# Patient Record
Sex: MALE | Race: WHITE | Employment: FULL TIME | URBAN - METROPOLITAN AREA
[De-identification: names, ages, dates, MRNs, and addresses within clinical notes are randomized per-mention and may not be internally consistent; named-entity substitution may affect disease eponyms.]

---

## 2017-01-20 ENCOUNTER — HOSPITAL ENCOUNTER (OUTPATIENT)
Dept: OTHER | Age: 57
Discharge: HOME | End: 2017-01-20
Attending: INTERNAL MEDICINE | Admitting: INTERNAL MEDICINE

## 2017-01-20 LAB
ALBUMIN SERPL-MCNC: 4.2 GM/DL (ref 3.4–5)
ALP BLD-CCNC: 71 IU/L (ref 40–129)
ALT SERPL-CCNC: 19 U/L (ref 10–40)
ANION GAP SERPL CALCULATED.3IONS-SCNC: 11 MMOL/L (ref 4–16)
AST SERPL-CCNC: 17 IU/L (ref 15–37)
BILIRUB SERPL-MCNC: 0.4 MG/DL (ref 0–1)
BUN BLDV-MCNC: 11 MG/DL (ref 6–23)
CALCIUM SERPL-MCNC: 9.5 MG/DL (ref 8.3–10.6)
CHLORIDE BLD-SCNC: 101 MMOL/L (ref 99–110)
CO2: 26 MMOL/L (ref 21–32)
CREAT SERPL-MCNC: 0.7 MG/DL (ref 0.9–1.3)
GFR AFRICAN AMERICAN: >60 ML/MIN/1.73M2
GFR NON-AFRICAN AMERICAN: >60 ML/MIN/1.73M2
GLUCOSE FASTING: 243 MG/DL (ref 70–99)
LACTATE DEHYDROGENASE: 134 IU/L (ref 120–246)
POTASSIUM SERPL-SCNC: 5 MMOL/L (ref 3.5–5.1)
SODIUM BLD-SCNC: 138 MMOL/L (ref 135–145)
TOTAL PROTEIN: 6.9 GM/DL (ref 6.4–8.2)

## 2017-02-21 PROBLEM — I83.893 VARICOSE VEINS OF BILATERAL LOWER EXTREMITIES WITH OTHER COMPLICATIONS: Status: ACTIVE | Noted: 2017-02-21

## 2017-02-21 PROBLEM — Z98.890 STATUS POST ENDOVENOUS RADIOFREQUENCY ABLATION (RFA) OF SAPHENOUS VEIN: Status: ACTIVE | Noted: 2017-02-21

## 2017-05-19 PROBLEM — I83.892 VARICOSE VEINS OF LEFT LOWER EXTREMITY WITH COMPLICATIONS: Status: ACTIVE | Noted: 2017-05-19

## 2017-05-26 ENCOUNTER — HOSPITAL ENCOUNTER (OUTPATIENT)
Dept: OTHER | Age: 57
Discharge: HOME | End: 2017-05-26
Attending: INTERNAL MEDICINE | Admitting: INTERNAL MEDICINE

## 2017-05-26 LAB
BANDED NEUTROPHILS ABSOLUTE COUNT: 0.73 K/CU MM
BANDED NEUTROPHILS RELATIVE PERCENT: 5 % (ref 5–11)
DIFFERENTIAL TYPE: ABNORMAL
EOSINOPHILS ABSOLUTE: 0.3 K/CU MM
EOSINOPHILS RELATIVE PERCENT: 2 % (ref 0–3)
HCT VFR BLD CALC: 52.3 % (ref 42–52)
HEMOGLOBIN: 17.2 GM/DL (ref 13.5–18)
LYMPHOCYTES ABSOLUTE: 4.2 K/CU MM
LYMPHOCYTES RELATIVE PERCENT: 29 % (ref 24–44)
MCH RBC QN AUTO: 30.9 PG (ref 27–31)
MCHC RBC AUTO-ENTMCNC: 32.9 % (ref 32–36)
MCV RBC AUTO: 94.1 FL (ref 78–100)
MONOCYTES ABSOLUTE: 0.6 K/CU MM
MONOCYTES RELATIVE PERCENT: 4 % (ref 0–4)
PDW BLD-RTO: 13.4 % (ref 11.7–14.9)
PLATELET # BLD: 292 K/CU MM (ref 140–440)
PLT MORPHOLOGY: ABNORMAL
PMV BLD AUTO: 11.9 FL (ref 7.5–11.1)
RBC # BLD: 5.56 M/CU MM (ref 4.6–6.2)
SEGMENTED NEUTROPHILS ABSOLUTE COUNT: 8.8 K/CU MM
SEGMENTED NEUTROPHILS RELATIVE PERCENT: 60 % (ref 36–66)
WBC # BLD: 14.6 K/CU MM (ref 4–10.5)
WBC # BLD: ABNORMAL 10*3/UL

## 2017-06-02 PROBLEM — Z98.890 STATUS POST PHLEBECTOMY: Status: ACTIVE | Noted: 2017-06-02

## 2017-08-04 PROBLEM — I83.891 VARICOSE VEINS OF RIGHT LOWER EXTREMITY WITH COMPLICATIONS: Status: ACTIVE | Noted: 2017-08-04

## 2017-10-20 ENCOUNTER — HOSPITAL ENCOUNTER (OUTPATIENT)
Dept: OTHER | Age: 57
Discharge: HOME | End: 2017-10-20
Attending: INTERNAL MEDICINE | Admitting: INTERNAL MEDICINE

## 2017-10-20 LAB
ALBUMIN SERPL-MCNC: 4.6 GM/DL (ref 3.4–5)
ALP BLD-CCNC: 75 IU/L (ref 40–128)
ALT SERPL-CCNC: 23 U/L (ref 10–40)
ANION GAP SERPL CALCULATED.3IONS-SCNC: 15 MMOL/L (ref 4–16)
AST SERPL-CCNC: 19 IU/L (ref 15–37)
BILIRUB SERPL-MCNC: 0.3 MG/DL (ref 0–1)
BUN BLDV-MCNC: 20 MG/DL (ref 6–23)
CALCIUM SERPL-MCNC: 10.1 MG/DL (ref 8.3–10.6)
CHLORIDE BLD-SCNC: 102 MMOL/L (ref 99–110)
CO2: 26 MMOL/L (ref 21–32)
CREAT SERPL-MCNC: 0.7 MG/DL (ref 0.9–1.3)
GFR AFRICAN AMERICAN: >60 ML/MIN/1.73M2
GFR NON-AFRICAN AMERICAN: >60 ML/MIN/1.73M2
GLUCOSE BLD-MCNC: 153 MG/DL (ref 70–140)
LACTATE DEHYDROGENASE: 152 IU/L (ref 120–246)
POTASSIUM SERPL-SCNC: 5.2 MMOL/L (ref 3.5–5.1)
SODIUM BLD-SCNC: 143 MMOL/L (ref 135–145)
TOTAL PROTEIN: 7.2 GM/DL (ref 6.4–8.2)

## 2017-10-24 LAB
ALBUMIN ELP: 3.9 GM/DL (ref 3.2–5.6)
ALPHA-1-GLOBULIN: 0.2 GM/DL (ref 0.1–0.4)
ALPHA-2-GLOBULIN: 0.8 GM/DL (ref 0.4–1.2)
BETA GLOBULIN: 1 GM/DL (ref 0.5–1.3)
GAMMA GLOBULIN: 1.2 GM/DL (ref 0.5–1.6)
TOTAL PROTEIN: 7.2 GM/DL (ref 6.4–8.2)

## 2019-03-04 ENCOUNTER — HOSPITAL ENCOUNTER (OUTPATIENT)
Dept: CARDIAC CATH/INVASIVE PROCEDURES | Age: 59
Discharge: HOME | End: 2019-03-04
Attending: THORACIC SURGERY (CARDIOTHORACIC VASCULAR SURGERY) | Admitting: THORACIC SURGERY (CARDIOTHORACIC VASCULAR SURGERY)
Payer: COMMERCIAL

## 2019-03-04 VITALS
RESPIRATION RATE: 14 BRPM | HEIGHT: 69 IN | WEIGHT: 230 LBS | DIASTOLIC BLOOD PRESSURE: 76 MMHG | TEMPERATURE: 98.3 F | HEART RATE: 63 BPM | SYSTOLIC BLOOD PRESSURE: 113 MMHG | BODY MASS INDEX: 34.07 KG/M2 | OXYGEN SATURATION: 89 %

## 2019-03-04 PROBLEM — I73.9 PAD (PERIPHERAL ARTERY DISEASE) (HCC): Status: ACTIVE | Noted: 2019-03-04

## 2019-03-04 LAB
ACTIVATED CLOTTING TIME, LOW RANGE: 177 SEC
ACTIVATED CLOTTING TIME, LOW RANGE: 271 SEC
ANION GAP SERPL CALCULATED.3IONS-SCNC: 12 MMOL/L (ref 4–16)
APTT: 24 SECONDS (ref 21.2–33)
BUN BLDV-MCNC: 24 MG/DL (ref 6–23)
CALCIUM SERPL-MCNC: 8.9 MG/DL (ref 8.3–10.6)
CHLORIDE BLD-SCNC: 102 MMOL/L (ref 99–110)
CO2: 24 MMOL/L (ref 21–32)
CREAT SERPL-MCNC: 0.6 MG/DL (ref 0.9–1.3)
GFR AFRICAN AMERICAN: >60 ML/MIN/1.73M2
GFR NON-AFRICAN AMERICAN: >60 ML/MIN/1.73M2
GLUCOSE BLD-MCNC: 147 MG/DL (ref 70–99)
INR BLD: 0.91 INDEX
POTASSIUM SERPL-SCNC: 4.1 MMOL/L (ref 3.5–5.1)
PROTHROMBIN TIME: 10.6 SECONDS (ref 9.12–12.5)
SODIUM BLD-SCNC: 138 MMOL/L (ref 135–145)

## 2019-03-04 PROCEDURE — 37225 HC FEM POP TERRITORY ATHERECTOMY: CPT

## 2019-03-04 PROCEDURE — 6360000004 HC RX CONTRAST MEDICATION

## 2019-03-04 PROCEDURE — 2709999900 HC NON-CHARGEABLE SUPPLY

## 2019-03-04 PROCEDURE — 2500000003 HC RX 250 WO HCPCS

## 2019-03-04 PROCEDURE — C1887 CATHETER, GUIDING: HCPCS

## 2019-03-04 PROCEDURE — C2623 CATH, TRANSLUMIN, DRUG-COAT: HCPCS

## 2019-03-04 PROCEDURE — 75710 ARTERY X-RAYS ARM/LEG: CPT

## 2019-03-04 PROCEDURE — 6370000000 HC RX 637 (ALT 250 FOR IP): Performed by: THORACIC SURGERY (CARDIOTHORACIC VASCULAR SURGERY)

## 2019-03-04 PROCEDURE — C1894 INTRO/SHEATH, NON-LASER: HCPCS

## 2019-03-04 PROCEDURE — 85610 PROTHROMBIN TIME: CPT

## 2019-03-04 PROCEDURE — 85730 THROMBOPLASTIN TIME PARTIAL: CPT

## 2019-03-04 PROCEDURE — 85347 COAGULATION TIME ACTIVATED: CPT

## 2019-03-04 PROCEDURE — C1725 CATH, TRANSLUMIN NON-LASER: HCPCS

## 2019-03-04 PROCEDURE — C1724 CATH, TRANS ATHEREC,ROTATION: HCPCS

## 2019-03-04 PROCEDURE — 2580000003 HC RX 258: Performed by: THORACIC SURGERY (CARDIOTHORACIC VASCULAR SURGERY)

## 2019-03-04 PROCEDURE — 2580000003 HC RX 258

## 2019-03-04 PROCEDURE — 75625 CONTRAST EXAM ABDOMINL AORTA: CPT

## 2019-03-04 PROCEDURE — 6360000002 HC RX W HCPCS

## 2019-03-04 PROCEDURE — 6370000000 HC RX 637 (ALT 250 FOR IP)

## 2019-03-04 PROCEDURE — 80048 BASIC METABOLIC PNL TOTAL CA: CPT

## 2019-03-04 PROCEDURE — C1884 EMBOLIZATION PROTECT SYST: HCPCS

## 2019-03-04 PROCEDURE — C1769 GUIDE WIRE: HCPCS

## 2019-03-04 RX ORDER — DIAZEPAM 5 MG/1
5 TABLET ORAL ONCE
Status: COMPLETED | OUTPATIENT
Start: 2019-03-04 | End: 2019-03-04

## 2019-03-04 RX ORDER — DIPHENHYDRAMINE HCL 25 MG
50 TABLET ORAL ONCE
Status: COMPLETED | OUTPATIENT
Start: 2019-03-04 | End: 2019-03-04

## 2019-03-04 RX ORDER — SODIUM CHLORIDE 9 MG/ML
INJECTION, SOLUTION INTRAVENOUS CONTINUOUS
Status: DISCONTINUED | OUTPATIENT
Start: 2019-03-04 | End: 2019-03-04 | Stop reason: HOSPADM

## 2019-03-04 RX ORDER — CLOPIDOGREL BISULFATE 75 MG/1
75 TABLET ORAL DAILY
Qty: 30 TABLET | Refills: 0 | Status: SHIPPED | OUTPATIENT
Start: 2019-03-04 | End: 2019-03-18 | Stop reason: SDUPTHER

## 2019-03-04 RX ADMIN — DIAZEPAM 5 MG: 5 TABLET ORAL at 06:26

## 2019-03-04 RX ADMIN — SODIUM CHLORIDE: 9 INJECTION, SOLUTION INTRAVENOUS at 06:26

## 2019-03-04 RX ADMIN — DIPHENHYDRAMINE HCL 50 MG: 25 TABLET ORAL at 06:26

## 2019-03-04 ASSESSMENT — PAIN DESCRIPTION - ORIENTATION: ORIENTATION: RIGHT

## 2019-03-04 ASSESSMENT — PAIN DESCRIPTION - LOCATION: LOCATION: LEG

## 2019-03-04 ASSESSMENT — PAIN SCALES - GENERAL: PAINLEVEL_OUTOF10: 5

## 2021-06-22 PROBLEM — K42.9 UMBILICAL HERNIA WITHOUT OBSTRUCTION AND WITHOUT GANGRENE: Status: ACTIVE | Noted: 2021-06-22

## 2021-07-24 PROBLEM — D35.02 ADRENAL ADENOMA, LEFT: Status: ACTIVE | Noted: 2021-07-24

## 2021-07-24 PROBLEM — N28.1 RENAL CYST, RIGHT: Status: ACTIVE | Noted: 2021-07-24

## 2021-08-27 PROBLEM — Z09 S/P UMBILICAL HERNIA REPAIR, FOLLOW-UP EXAM: Status: ACTIVE | Noted: 2021-08-27

## 2021-09-15 PROBLEM — I83.892 VARICOSE VEINS OF LEFT LOWER EXTREMITY WITH OTHER COMPLICATIONS: Status: ACTIVE | Noted: 2021-09-15

## 2021-09-15 PROBLEM — G89.18 ACUTE POSTOPERATIVE PAIN: Status: ACTIVE | Noted: 2021-09-15

## 2021-09-26 PROBLEM — Z09 S/P UMBILICAL HERNIA REPAIR, FOLLOW-UP EXAM: Status: RESOLVED | Noted: 2021-08-27 | Resolved: 2021-09-26

## 2022-04-08 ENCOUNTER — HOSPITAL ENCOUNTER (EMERGENCY)
Age: 62
Discharge: HOME | End: 2022-04-08
Attending: EMERGENCY MEDICINE
Payer: COMMERCIAL

## 2022-04-08 VITALS
RESPIRATION RATE: 18 BRPM | DIASTOLIC BLOOD PRESSURE: 77 MMHG | OXYGEN SATURATION: 99 % | HEART RATE: 84 BPM | SYSTOLIC BLOOD PRESSURE: 148 MMHG | HEIGHT: 70 IN | WEIGHT: 242 LBS | TEMPERATURE: 98.4 F | BODY MASS INDEX: 34.65 KG/M2

## 2022-04-08 DIAGNOSIS — R31.0 GROSS HEMATURIA: Primary | ICD-10-CM

## 2022-04-08 LAB
BACTERIA: NEGATIVE /HPF
BILIRUBIN URINE: NEGATIVE MG/DL
BLOOD, URINE: ABNORMAL
CAST TYPE: ABNORMAL /HPF
CLARITY: ABNORMAL
COLOR: YELLOW
CRYSTAL TYPE: NEGATIVE /HPF
EPITHELIAL CELLS, UA: 1 /HPF
GLUCOSE, URINE: >1000 MG/DL
KETONES, URINE: ABNORMAL MG/DL
LEUKOCYTE ESTERASE, URINE: NEGATIVE
NITRITE URINE, QUANTITATIVE: NEGATIVE
PH, URINE: 6.5 (ref 5–8)
PROTEIN UA: ABNORMAL MG/DL
RBC URINE: >200 /HPF (ref 0–3)
SPECIFIC GRAVITY UA: 1.01 (ref 1–1.03)
UROBILINOGEN, URINE: 2 MG/DL (ref 0.2–1)
WBC UA: 1 /HPF (ref 0–2)

## 2022-04-08 PROCEDURE — 87077 CULTURE AEROBIC IDENTIFY: CPT

## 2022-04-08 PROCEDURE — 81001 URINALYSIS AUTO W/SCOPE: CPT

## 2022-04-08 PROCEDURE — 87186 SC STD MICRODIL/AGAR DIL: CPT

## 2022-04-08 PROCEDURE — 99284 EMERGENCY DEPT VISIT MOD MDM: CPT

## 2022-04-08 PROCEDURE — 87086 URINE CULTURE/COLONY COUNT: CPT

## 2022-04-08 RX ORDER — CEPHALEXIN 500 MG/1
500 CAPSULE ORAL 2 TIMES DAILY
Qty: 14 CAPSULE | Refills: 0 | Status: SHIPPED | OUTPATIENT
Start: 2022-04-08 | End: 2022-04-15

## 2022-04-08 NOTE — ED PROVIDER NOTES
CHIEF COMPLAINT  Chief Complaint   Patient presents with   Evan SIMONS  Carol Garcia is a 62 y.o. male with history of diabetes, hypertension who presents bright red blood in his urine for the past 2 urinations that occurred just prior to arrival.  No associated other signs or symptoms such as flank pain, dysuria, abdominal pain, scrotal pain or swelling, trauma to the abdomen. Denies known history of renal calculi, he is on Jardiance for his diabetes and was told by primary care to drink plenty of water. He has had one episode previously of blood in his urine, was treated with antibiotics for UTI and it resolved. He denies any fevers or vomiting. He is otherwise active, has been well over the course of this today. He is able to urinate without difficulty. REVIEW OF SYSTEMS  Review of Systems   History obtained from chart review and the patient, family at bedside  General ROS: positive for  - fatigue  Ophthalmic ROS: Negative  ENT ROS: negative for - headaches  Hematological and Lymphatic ROS: negative for - bleeding problems  Endocrine ROS: negative for - unexpected weight changes  Respiratory ROS: no cough, shortness of breath, or wheezing  Cardiovascular ROS: no chest pain or dyspnea on exertion  Gastrointestinal ROS: no abdominal pain, change in bowel habits, or black or bloody stools  Genito-Urinary ROS: no dysuria, trouble voiding  Musculoskeletal ROS: negative  Neurological ROS: no TIA or stroke symptoms      PAST MEDICAL HISTORY  Past Medical History:   Diagnosis Date    Diabetes ([address]     Hyperlipidemia     Hypertension     Varicose veins of both lower extremities with pain        FAMILY HISTORY  History reviewed. No pertinent family history.     SOCIAL HISTORY  Social History     Socioeconomic History    Marital status:      Spouse name: None    Number of children: None    Years of education: None    Highest education level: None   Occupational History    None   Tobacco Use    Smoking status: Former Smoker     Quit date: 2018     Years since quittin.2    Smokeless tobacco: Never Used   Substance and Sexual Activity    Alcohol use: No    Drug use: None    Sexual activity: None   Other Topics Concern    None   Social History Narrative    None     Social Determinants of Health     Financial Resource Strain:     Difficulty of Paying Living Expenses: Not on file   Food Insecurity:     Worried About Running Out of Food in the Last Year: Not on file    Leonardo of Food in the Last Year: Not on file   Transportation Needs:     Lack of Transportation (Medical): Not on file    Lack of Transportation (Non-Medical): Not on file   Physical Activity:     Days of Exercise per Week: Not on file    Minutes of Exercise per Session: Not on file   Stress:     Feeling of Stress : Not on file   Social Connections:     Frequency of Communication with Friends and Family: Not on file    Frequency of Social Gatherings with Friends and Family: Not on file    Attends Jewish Services: Not on file    Active Member of [address] [place] [address] or Organizations: Not on file    Attends Club or Organization Meetings: Not on file    Marital Status: Not on file   Intimate Partner Violence:     Fear of Current or Ex-Partner: Not on file    Emotionally Abused: Not on file    Physically Abused: Not on file    Sexually Abused: Not on file   Housing Stability:     Unable to Pay for Housing in the Last Year: Not on file    Number of [place] in the Last Year: Not on file    Unstable Housing in the Last Year: Not on file       SURGICAL HISTORY  Past Surgical History:   Procedure Laterality Date   [address]    VARICOSE VEIN SURGERY  2017    PHLEBECTOMY       CURRENT MEDICATIONS  No current facility-administered medications on file prior to encounter.      Current Outpatient Medications on File Prior to Encounter   Medication Sig Dispense Refill    amLODIPine (NORVASC) 10 MG tablet       HYDROcodone-acetaminophen (NORCO) 5-325 MG per tablet       clopidogrel (PLAVIX) 75 MG tablet Take 1 tablet by mouth daily 30 tablet 3    JARDIANCE 25 MG tablet       HUMALOG KWIKPEN 100 UNIT/ML pen       ibuprofen (ADVIL;MOTRIN) 800 MG tablet       hydrochlorothiazide (HYDRODIURIL) 12.5 MG tablet       amLODIPine-valsartan (EXFORGE)  MG per tablet       ALPRAZolam (XANAX) 1 MG tablet half tablet 2-4 times daily as needed      pravastatin (PRAVACHOL) 20 MG tablet       glimepiride (AMARYL) 4 MG tablet       metFORMIN (GLUCOPHAGE) 1000 MG tablet       bisoprolol-hydrochlorothiazide (ZIAC) 10-6.25 MG per tablet       DULoxetine (CYMBALTA) 60 MG extended release capsule            ALLERGIES  No Known Allergies    PHYSICAL EXAM  VITAL SIGNS: BP (!) 148/77   Pulse 84   Temp 98.4 °F (36.9 °C)   Resp 18   Ht 5' 10\" (1.778 m)   Wt 242 lb (109.8 kg)   SpO2 99%   BMI 34.72 kg/m²   Constitutional: Well developed, Well nourished, seen in bed, pleasant  HENT: Normocephalic, Atraumatic, Bilateral external ears normal, Oropharynx moist, No oral exudates, Nose normal.   Eyes: PERRL, EOMI, Conjunctiva normal, No discharge. Neck: Normal range of motion, Supple, No stridor. Cardiovascular: Normal heart rate, Normal rhythm, No murmurs, No rubs, No gallops. Thorax & Lungs: Normal breath sounds, No respiratory distress, No wheezing, No chest tenderness. Abdomen: Bowel sounds normal, Soft, No tenderness, no guarding, no rebound, No masses, No pulsatile masses. No CVA tap tenderness  Skin: Warm, Dry, No erythema, No rash. Extremities: Intact distal pulses, No edema, No tenderness, No cyanosis, No clubbing. Musculoskeletal: Good gross range of motion in all major joints. No major deformities noted. Neurologic: Alert & oriented x 3, Normal gross motor function, Normal gross sensory function, No focal deficits noted.    Psychiatric: Affect normal        RADIOLOGY/PROCEDURES/LABS    Labs Reviewed   URINALYSIS WITH MICROSCOPIC - Abnormal; Notable for the following components:       Result Value    Clarity, UA TURBID (*)     Glucose, Urine >1,000 (*)     Ketones, Urine TRACE (*)     Blood, Urine LARGE NUMBER OR AMOUNT OBSERVED (*)     Protein, UA TRACE (*)     Urobilinogen, Urine 2.0 (*)     RBC, UA >200 (*)     All other components within normal limits   CULTURE, URINE         Medications - No data to display    COURSE & MEDICAL DECISION MAKING  Pertinent Labs & Imaging studies reviewed. (See chart for details)    73-year-old male presents with gross hematuria. UA here not clearly demonstrated of of UTI, but patient will be trialed on Keflex, urine culture will be sent and patient referred to urology. Its possible that with his painless hematuria that he has bladder ulceration, bladder cancer, this was discussed with him and he will require follow-up with urology. He is having painless hematuria. He is not having signs or symptoms suggestive of renal calculi, he is not having any history of trauma. He is voiding without difficulty. He is otherwise at baseline health, not complaining of signs and symptoms which would make me concerned for DKA, HHS. There is no evidence of bilirubin in his urine. Discharged in stable condition, strict return precautions put into place but he is agreeable to call urology first thing Monday to arrange follow-up and to return immediately if he is unable to void. FINAL IMPRESSION  Problem List Items Addressed This Visit     None      Visit Diagnoses     Gross hematuria    -  Primary      1.    2.   3.    Patient gave me permission to discuss medical history, care, and plan with those present in the room.   Electronically signed by: Kit Arredondo MD, 4/8/2022  MD Kit Yoder MD  04/08/22 [id number]

## 2022-04-11 LAB
CULTURE: ABNORMAL
CULTURE: ABNORMAL
Lab: ABNORMAL
SPECIMEN: ABNORMAL

## 2022-04-11 NOTE — ED NOTES
The patient called today to get his urine culture results. I discussed the results with Dr. Adenike Diallo and he wanted to change the prescription of Keflex from twice daily to 4 times daily. Dr. Adenike Diallo called in the new prescription.         Gordo Schneider RN  04/11/22 1143

## 2022-04-11 NOTE — ED PROVIDER NOTES
Patient's urine culture did come back positive for Enterococcus faecalis. He is currently on Keflex. Patient was instructed to take 500 mg p.o. 4 times daily for the next 7 days. In discussion with the patient is denying fevers chills back pain dysuria or other current complaints at this time.   Prescription was called to [facility] for completion of his antibiotic course     Casey Jordan MD  04/11/22 [id number]

## 2023-03-24 PROBLEM — R22.42 LOCALIZED SWELLING OF LEFT LOWER LEG: Status: ACTIVE | Noted: 2023-03-24

## 2023-08-05 PROBLEM — I83.891 VARICOSE VEINS OF RIGHT LOWER EXTREMITY WITH OTHER COMPLICATIONS: Status: ACTIVE | Noted: 2023-08-05

## 2023-08-05 PROBLEM — R22.41 LOCALIZED SWELLING OF RIGHT FOOT: Status: ACTIVE | Noted: 2023-08-05

## 2023-10-02 PROBLEM — I83.891 VARICOSE VEINS OF LOWER EXTREMITIES WITH COMPLICATIONS, RIGHT: Status: ACTIVE | Noted: 2023-10-02

## 2024-02-05 ENCOUNTER — TELEPHONE (OUTPATIENT)
Dept: GASTROENTEROLOGY | Age: 64
End: 2024-02-05

## 2024-02-05 NOTE — TELEPHONE ENCOUNTER
Called pt. In regards to a referral for a colon screening.Lm for pt to call back to make an appt.

## 2024-02-22 PROBLEM — I83.892 VARICOSE VEINS OF LOWER EXTREMITIES WITH COMPLICATIONS, LEFT: Status: ACTIVE | Noted: 2024-02-22

## 2024-03-14 ENCOUNTER — OFFICE VISIT (OUTPATIENT)
Dept: GASTROENTEROLOGY | Age: 64
End: 2024-03-14

## 2024-03-14 VITALS
DIASTOLIC BLOOD PRESSURE: 70 MMHG | WEIGHT: 249.8 LBS | HEIGHT: 70 IN | SYSTOLIC BLOOD PRESSURE: 124 MMHG | TEMPERATURE: 97.6 F | OXYGEN SATURATION: 94 % | HEART RATE: 72 BPM | BODY MASS INDEX: 35.76 KG/M2

## 2024-03-14 DIAGNOSIS — Z12.11 COLON CANCER SCREENING: Primary | ICD-10-CM

## 2024-03-14 NOTE — PROGRESS NOTES
and no murmur noted, no evidence of AAA  ABDOMEN:  normal bowel sounds, soft, non-distended, non-tender with no hernias apprecioated nor masses palpated--no hepatosplenomegaly  NEUROLOGIC: cranial nerves 2-12 grossly intact,no focal deficit detected, no asterixis  SKIN:  no lesions, jaundice  EXTREMITIES: no clubbing, cyanosis, or edema    DATA:      CBC:    Lab Results   Component Value Date/Time    WBC 14.6 05/26/2017 09:40 AM    HGB 17.2 05/26/2017 09:40 AM    MCV 94.1 05/26/2017 09:40 AM     05/26/2017 09:40 AM     CMP:    Lab Results   Component Value Date/Time     03/04/2019 06:19 AM    K 4.1 03/04/2019 06:19 AM     03/04/2019 06:19 AM    CO2 24 03/04/2019 06:19 AM    BUN 24 03/04/2019 06:19 AM    CREATININE 0.6 03/04/2019 06:19 AM    GFRAA >60 03/04/2019 06:19 AM    LABGLOM >60 03/04/2019 06:19 AM    GLUCOSE 147 03/04/2019 06:19 AM    PROT 7.2 10/20/2017 09:23 AM    PROT 7.2 10/20/2017 09:23 AM    LABALBU 4.6 10/20/2017 09:23 AM    CALCIUM 8.9 03/04/2019 06:19 AM    BILITOT 0.3 10/20/2017 09:23 AM    ALKPHOS 75 10/20/2017 09:23 AM    AST 19 10/20/2017 09:23 AM    ALT 23 10/20/2017 09:23 AM         IMPRESSION:  1) need for screening colonoscopy  2) PAD, varicose vein issues      RECOMMENDATIONS:  1) colonoscopy          Sumit Goldstein M.D.

## 2024-04-13 PROBLEM — Z12.11 COLON CANCER SCREENING: Status: RESOLVED | Noted: 2024-03-14 | Resolved: 2024-04-13

## 2024-05-16 PROBLEM — Z09 POSTOP CHECK: Status: RESOLVED | Noted: 2021-08-27 | Resolved: 2024-05-16

## 2024-05-21 NOTE — PROGRESS NOTES
Patient will be called with an arrival time on 5/28/2024 for his procedure at [facility] on 5/29/2024.    NOTHING TO EAT OR DRINK AFTER MIDNIGHT DAY OF SURGERY    1. Enter thru the hospital main entrance on day of surgery, check in at the Information Desk. If you arrive prior to 6:00am, enter thru the ER entrance.    2. Follow the directions as prescribed by the doctor for your procedure and medications.         Morning of surgery take:Patient states\" he will take his medications after his procedure.         Stop vitamins, supplements and NSAIDS:      3. Check with your Doctor regarding stopping blood thinners and follow their instructions.    4. Do not smoke, vape or use chewing tobacco morning of surgery. Do not drink any alcoholic beverages 24 hours prior to surgery.       This includes NA Beer. No street drugs 7 days prior to surgery.    5. If you have dentures, contacts of glasses they will be removed before going to the OR; please bring a case.    6. Please bring picture ID, insurance card, paperwork from the doctor’s office (H & P, Consent, & card for implantable devices).    7. Take a shower with an antibacterial soap the night before surgery and the morning of surgery. Do not put anything on your skin      After your morning shower.    8. You will need a responsible adult to drive you home and check on you after surgery.

## 2024-05-26 NOTE — H&P
I have examined the patient within 24 hours before the procedure and there is no change in the previous history and physical exam,which has been reviewed.There is no history of sleep apnea, snoring, or stridor.  There has been no  previous adverse experience with sedation/anesthesia. There is no increased risk for aspiration of gastric contents.  The patient has been instructed that all resuscitative measures (during the operative and immediate perioperative period) will be instituted in the unlikely event that they will be needed.The patient has no pertinent past surgical or FH other than listed in the original H&P.     ASA Class: 3  AIRWAY Class: 1     Consent form signed, witnessed and in soft chart

## 2024-05-28 ENCOUNTER — ANESTHESIA EVENT (OUTPATIENT)
Dept: ENDOSCOPY | Age: 64
End: 2024-05-28
Payer: COMMERCIAL

## 2024-05-28 NOTE — ANESTHESIA PRE PROCEDURE
Department of Anesthesiology  Preprocedure Note       Name:  Arnulfo Hanley   Age:  63 y.o.  :  1960                                          MRN:  [id number]         Date:  2024      Surgeon: Surgeon(s):  Sumit Goldstein MD    Procedure: Procedure(s):  COLORECTAL CANCER SCREENING, NOT HIGH RISK    Medications prior to admission:   Prior to Admission medications    Medication Sig Start Date End Date Taking? Authorizing Provider   cilostazol (PLETAL) 50 MG tablet  7/3/23   Hamida Strickland MD   aspirin 81 MG EC tablet Take 1 tablet by mouth daily    Hamida Strickland MD   glimepiride (AMARYL) 2 MG tablet Take 1 tablet by mouth every evening Take with evening meal    Hamida Strickland MD   Insulin Glargine (BASAGLAR KWIKPEN SC) Inject 50 Units into the skin 2 times daily 50 units in AM and 50 units at bedtime    Hamida Strickland MD   gabapentin (NEURONTIN) 300 MG capsule Take 1 capsule by mouth at bedtime.    Hamida Strickland MD   HYDROcodone-acetaminophen (NORCO) 5-325 MG per tablet 1 tablet every 6 hours as needed. 21   Hamida Strickland MD   JARDIANCE 25 MG tablet 1 tablet daily 19   Hamida Strickland MD   HUMALOG KWIKPEN 100 UNIT/ML pen 10 unit with every meal plus sliding scale 17   Hamida Strickland MD   hydrochlorothiazide (HYDRODIURIL) 12.5 MG tablet 1 tablet daily 16   Hamida Strickland MD   amLODIPine-valsartan (EXFORGE)  MG per tablet 1 tablet daily 17   Hamida Strickland MD   ALPRAZolam (XANAX) 1 MG tablet half tablet 2-4 times daily as needed    Hamida Strickland MD   pravastatin (PRAVACHOL) 20 MG tablet 2 tablets 17   Hamida Strickland MD   glimepiride (AMARYL) 4 MG tablet 1 tablet every morning (before breakfast) 17   Hamida Strickland MD   metFORMIN (GLUCOPHAGE) 1000 MG tablet Take 1 tablet by mouth 2 times daily (with meals) 17   Hamida Strickland MD   bisoprolol-hydrochlorothiazide

## 2024-05-28 NOTE — PROGRESS NOTES
Left message on patient's home phone and cell for him to arrive at 0830 at [facility] on 5/29/2024 for his procedure at 1000. Orders are in epic.

## 2024-05-29 ENCOUNTER — HOSPITAL ENCOUNTER (OUTPATIENT)
Age: 64
Setting detail: OUTPATIENT SURGERY
Discharge: HOME | End: 2024-05-29
Attending: SPECIALIST | Admitting: SPECIALIST
Payer: COMMERCIAL

## 2024-05-29 ENCOUNTER — ANESTHESIA (OUTPATIENT)
Dept: ENDOSCOPY | Age: 64
End: 2024-05-29
Payer: COMMERCIAL

## 2024-05-29 VITALS
DIASTOLIC BLOOD PRESSURE: 68 MMHG | TEMPERATURE: 97.4 F | WEIGHT: 249 LBS | SYSTOLIC BLOOD PRESSURE: 129 MMHG | HEART RATE: 60 BPM | RESPIRATION RATE: 18 BRPM | HEIGHT: 70 IN | OXYGEN SATURATION: 95 % | BODY MASS INDEX: 35.65 KG/M2

## 2024-05-29 DIAGNOSIS — Z12.11 SCREEN FOR COLON CANCER: ICD-10-CM

## 2024-05-29 PROBLEM — D12.2 BENIGN NEOPLASM OF ASCENDING COLON: Status: ACTIVE | Noted: 2024-05-29

## 2024-05-29 PROBLEM — D12.0 BENIGN NEOPLASM OF CECUM: Status: ACTIVE | Noted: 2024-05-29

## 2024-05-29 LAB — GLUCOSE BLD-MCNC: 118 MG/DL (ref 70–99)

## 2024-05-29 PROCEDURE — 88305 TISSUE EXAM BY PATHOLOGIST: CPT | Performed by: PATHOLOGY

## 2024-05-29 PROCEDURE — 7100000011 HC PHASE II RECOVERY - ADDTL 15 MIN: Performed by: SPECIALIST

## 2024-05-29 PROCEDURE — 3700000001 HC ADD 15 MINUTES (ANESTHESIA): Performed by: SPECIALIST

## 2024-05-29 PROCEDURE — 2580000003 HC RX 258: Performed by: ANESTHESIOLOGY

## 2024-05-29 PROCEDURE — C1889 IMPLANT/INSERT DEVICE, NOC: HCPCS | Performed by: SPECIALIST

## 2024-05-29 PROCEDURE — 2500000003 HC RX 250 WO HCPCS: Performed by: SPECIALIST

## 2024-05-29 PROCEDURE — 7100000010 HC PHASE II RECOVERY - FIRST 15 MIN: Performed by: SPECIALIST

## 2024-05-29 PROCEDURE — 3609010200 HC COLONOSCOPY ABLATION TUMOR POLYP/OTHER LES: Performed by: SPECIALIST

## 2024-05-29 PROCEDURE — 2709999900 HC NON-CHARGEABLE SUPPLY: Performed by: SPECIALIST

## 2024-05-29 PROCEDURE — 6360000002 HC RX W HCPCS: Performed by: SPECIALIST

## 2024-05-29 PROCEDURE — 45390 COLONOSCOPY W/RESECTION: CPT | Performed by: SPECIALIST

## 2024-05-29 PROCEDURE — 82962 GLUCOSE BLOOD TEST: CPT

## 2024-05-29 PROCEDURE — 3700000000 HC ANESTHESIA ATTENDED CARE: Performed by: SPECIALIST

## 2024-05-29 PROCEDURE — 6360000002 HC RX W HCPCS: Performed by: NURSE ANESTHETIST, CERTIFIED REGISTERED

## 2024-05-29 PROCEDURE — 45385 COLONOSCOPY W/LESION REMOVAL: CPT | Performed by: SPECIALIST

## 2024-05-29 PROCEDURE — 2500000003 HC RX 250 WO HCPCS: Performed by: NURSE ANESTHETIST, CERTIFIED REGISTERED

## 2024-05-29 DEVICE — CLIP LIG L235CM RESOL 360 BX/20: Type: IMPLANTABLE DEVICE | Site: ASCENDING COLON | Status: FUNCTIONAL

## 2024-05-29 DEVICE — INSTINCT PLUS ENDOSCOPIC CLIPPING DEVICE
Type: IMPLANTABLE DEVICE | Site: ASCENDING COLON | Status: FUNCTIONAL
Brand: INSTINCT

## 2024-05-29 RX ORDER — LIDOCAINE HYDROCHLORIDE 20 MG/ML
INJECTION, SOLUTION EPIDURAL; INFILTRATION; INTRACAUDAL; PERINEURAL PRN
Status: DISCONTINUED | OUTPATIENT
Start: 2024-05-29 | End: 2024-05-29 | Stop reason: SDUPTHER

## 2024-05-29 RX ORDER — PROPOFOL 10 MG/ML
INJECTION, EMULSION INTRAVENOUS PRN
Status: DISCONTINUED | OUTPATIENT
Start: 2024-05-29 | End: 2024-05-29 | Stop reason: SDUPTHER

## 2024-05-29 RX ORDER — SODIUM CHLORIDE, SODIUM LACTATE, POTASSIUM CHLORIDE, CALCIUM CHLORIDE 600; 310; 30; 20 MG/100ML; MG/100ML; MG/100ML; MG/100ML
INJECTION, SOLUTION INTRAVENOUS CONTINUOUS
Status: DISCONTINUED | OUTPATIENT
Start: 2024-05-29 | End: 2024-05-29 | Stop reason: HOSPADM

## 2024-05-29 RX ADMIN — PROPOFOL 50 MG: 10 INJECTION, EMULSION INTRAVENOUS at 10:05

## 2024-05-29 RX ADMIN — PROPOFOL 50 MG: 10 INJECTION, EMULSION INTRAVENOUS at 10:15

## 2024-05-29 RX ADMIN — LIDOCAINE HYDROCHLORIDE 100 MG: 20 INJECTION, SOLUTION EPIDURAL; INFILTRATION; INTRACAUDAL; PERINEURAL at 09:41

## 2024-05-29 RX ADMIN — PROPOFOL 50 MG: 10 INJECTION, EMULSION INTRAVENOUS at 09:46

## 2024-05-29 RX ADMIN — PROPOFOL 50 MG: 10 INJECTION, EMULSION INTRAVENOUS at 09:55

## 2024-05-29 RX ADMIN — PROPOFOL 50 MG: 10 INJECTION, EMULSION INTRAVENOUS at 10:00

## 2024-05-29 RX ADMIN — SODIUM CHLORIDE, POTASSIUM CHLORIDE, SODIUM LACTATE AND CALCIUM CHLORIDE: 600; 310; 30; 20 INJECTION, SOLUTION INTRAVENOUS at 08:44

## 2024-05-29 RX ADMIN — PROPOFOL 50 MG: 10 INJECTION, EMULSION INTRAVENOUS at 10:10

## 2024-05-29 RX ADMIN — PROPOFOL 50 MG: 10 INJECTION, EMULSION INTRAVENOUS at 09:50

## 2024-05-29 RX ADMIN — PROPOFOL 100 MG: 10 INJECTION, EMULSION INTRAVENOUS at 09:41

## 2024-05-29 ASSESSMENT — PAIN - FUNCTIONAL ASSESSMENT
PAIN_FUNCTIONAL_ASSESSMENT: 0-10
PAIN_FUNCTIONAL_ASSESSMENT: 0-10

## 2024-05-29 NOTE — PROGRESS NOTES
Returned toroom 18. Report received from Joyce CERVANTES. Alert and oriented. Respirations even and unlabored. Color pink. Abdomen soft and nontender. Beverage provided. Cousin at bedside. Call light in reach.

## 2024-05-29 NOTE — ANESTHESIA POSTPROCEDURE EVALUATION
Department of Anesthesiology  Postprocedure Note    Patient: Arnulfo Hanley  MRN: [id number]  YOB: 1960  Date of evaluation: 5/29/2024    Procedure Summary       Date: 05/29/24 Room / Location: [place] [address] / [facility]    Anesthesia Start: 0941 Anesthesia Stop: 1022    Procedure: COLONOSCOPY W/ ENDOSCOPIC MUCOSAL RESECTION LIFTED WITH 8ML OF BLUE BOOST, REMOVED WITH HOT SNARE AND JUMBO FORCEPS AND (3) HEMICLIPS PLACED IN THE ASCENDING COLON AT POLYPECTOMY SITE, INJECTED WITH 2 ML OF TATTOO INK Diagnosis:       Screen for colon cancer      (Screen for colon cancer [Z12.11])    Surgeons: Sumit Goldstein MD Responsible Provider: Gurvinder Cha MD    Anesthesia Type: MAC ASA Status: 3            Anesthesia Type: No value filed.    Kinga Phase I: Kinga Score: 10    Kinga Phase II:      Anesthesia Post Evaluation    Patient location during evaluation: bedside  Patient participation: complete - patient participated  Level of consciousness: sleepy but conscious  Pain score: 0  Airway patency: patent  Nausea & Vomiting: no nausea and no vomiting  Cardiovascular status: blood pressure returned to baseline and hemodynamically stable  Respiratory status: acceptable and nonlabored ventilation  Hydration status: euvolemic  Pain management: adequate    There were no known notable events for this encounter.

## 2024-05-29 NOTE — BRIEF OP NOTE
BRIEF COLONOSCOPY REPORT:   The original colonoscopy report with photos can be found by going to \"chart review\" then \"notes\" then \"colonoscopy\" then \"scan....\"    Impression:         -  Preparation of the colon was fair.         -  One 4 mm polyp in the cecum, removed with a cold snare.  Resected and            retrieved.         - A 12 mm sessile polyp was found in the ascending colon and removed by EMR:            Demarcation of the margins of the lesion was done with NBI. The polyp was            adequately lifted from the muscularis mucosa with submucosal injection of Blue            Boost. Snare resection with the hot snare and Endo-cut Q coag was then            performed. The polyp was completely removed en bloc and recovered with the            biopsy forceps and suction (via the working channel). Nubbins of residual polyp            were resected with the [place] biopsy forceps and cold snare.  The margins of the            resection site were then coagulated with snare-tip soft coag. The polypectomy            site was then closed with 3 clips. The mucosa 5-10 cm distally  (toward the            anus) was then marked with Lexi ink injection.         -  Internal hemorrhoids.         -  The examination was otherwise normal on direct and retroflexion views.         - the prep was not ideal and was especially poor in the left colon- so much so            that I was unable to exclude the presence of small or flat polyps    Recommendation:         - Repeat colonoscopy in 6 months for surveillance of polyps greater than 1 cm            in size and for better visualization of the left colon

## 2024-05-29 NOTE — DISCHARGE INSTRUCTIONS
COLONOSCOPY    DR. Goldstein     OFFICE NUMBER [id number]    FOLLOW UP APPOINTMENT: call office in one week for pathology results     REPEAT PROCEDURE IN 6 months     TEST ORDERED: NONE     What to expect at home:  Your Recovery   Your doctor will tell you when you can eat and do your other usual activities Your doctor will talk to you about when you will need your next colonoscopy. Your doctor can help you decide how often you need to be checked. This will depend on the results of your test and your risk for colorectal cancer.  After the test, you may be bloated or have gas pains. You may need to pass gas. If a biopsy was done or a polyp was removed, you may have streaks of blood in your stool (feces) for a few days.  This care sheet gives you a general idea about how long it will take for you to recover. But each person recovers at a different pace. Follow the steps below to get better as quickly as possible.  How can you care for yourself at home?  Activity  Rest when you feel tired.  Diet  Follow your doctor's directions for eating.  Unless your doctor has told you not to, drink plenty of fluids. This helps to replace the fluids that were lost during the colon prep.  DO NOT DRINK ALCOHOL.  Medicines  Your doctor will tell you if and when you can restart your medicines. He or she will also give you instructions about taking any new medicines.  If you take blood thinners, such as warfarin (Coumadin), clopidogrel (Plavix), or aspirin, be sure to talk to your doctor. He or she will tell you if and when to start taking those medicines again. Make sure that you understand exactly what your doctor wants you to do.  If polyps were removed or a biopsy was done during the test, your doctor may tell you not to take aspirin or other anti-inflammatory medicines for a few days. These include ibuprofen (Advil, Motrin) and naproxen (Aleve).  Other instructions: Anethesia  For your safety, do not drive or operate machinery for 24

## 2024-06-28 PROBLEM — Z12.11 SCREEN FOR COLON CANCER: Status: RESOLVED | Noted: 2024-03-14 | Resolved: 2024-06-28

## 2024-07-06 ENCOUNTER — HOSPITAL ENCOUNTER (EMERGENCY)
Age: 64
Discharge: HOME | End: 2024-07-06
Payer: COMMERCIAL

## 2024-07-06 VITALS
TEMPERATURE: 98.1 F | DIASTOLIC BLOOD PRESSURE: 76 MMHG | RESPIRATION RATE: 17 BRPM | SYSTOLIC BLOOD PRESSURE: 127 MMHG | HEART RATE: 60 BPM | OXYGEN SATURATION: 100 %

## 2024-07-06 DIAGNOSIS — T14.8XXA BLEEDING FROM WOUND: Primary | ICD-10-CM

## 2024-07-06 PROCEDURE — 99282 EMERGENCY DEPT VISIT SF MDM: CPT

## 2024-07-06 RX ORDER — LIDOCAINE HYDROCHLORIDE AND EPINEPHRINE BITARTRATE 20; .01 MG/ML; MG/ML
20 INJECTION, SOLUTION SUBCUTANEOUS ONCE
Status: DISCONTINUED | OUTPATIENT
Start: 2024-07-06 | End: 2024-07-06 | Stop reason: HOSPADM

## 2024-07-06 ASSESSMENT — PAIN - FUNCTIONAL ASSESSMENT: PAIN_FUNCTIONAL_ASSESSMENT: 0-10

## 2024-07-06 ASSESSMENT — PAIN SCALES - GENERAL
PAINLEVEL_OUTOF10: 0
PAINLEVEL_OUTOF10: 0

## 2024-07-07 NOTE — ED PROVIDER NOTES
lacerations  noted. On L distal medial calf  there is a punctate scab without bleeding, hematoma.   Psych: Mentation is grossly normal cognition is grossly normal. Affect is appropriate  Neuro: Motor intact sensory intact cranial nerves II through XII are intact level of consciousness is normal cerebellar function is normal reflexes are grossly normal. No evidence of incontinence or loss of bowel or bladder no saddle anesthesia noted Lymphatic: There is no submandibular or cervical adenopathy appreciated.    Procedure note-wound repair:  Risk and benefits were discussed with patient patient did give verbal consent.  Wound area cleansed in standard bedside fashion.  Using skin affix skin adhesive.  Applied over lesion.  Repeat examination reveals continued hemostasis.  Patient did tolerate procedure well.  Complications: None      I have reviewed and interpreted all of the currently available lab results from this visit (if applicable):  No results found for this visit on 07/06/24.   Radiographs (if obtained):  [] The following radiograph was interpreted by myself in the absence of a radiologist:   [] Radiologist's Report Reviewed:  No orders to display       EKG (if obtained):   Please See Note of attending physician for EKG interpretation.     Chart review shows recent radiograph(s):  US DUP LOWER ART/BYPASS GRAFTS BILATERAL COMPLETE    Result Date: 6/18/2024  IMPRESSION:IMPRESSION: For full  report, see ultrasound encounter with Shruthi Devon on 6/18/24 IMPRESSION:  The right ankle brachial index has decreased by 0.10  and the left ankle brachial index has decreased by 0.12 since previous study of June 26, 2023.  Imaging of the right lower extremity revealed atherosclerotic changes throughout the superficial femoral artery with a maximum 90% stenosis, which has increased since previous study.  Patent external iliac, common femoral, deep femoral, popliteal, posterior tibial trunk, posterior tibial, anterior tibial

## 2024-07-28 ENCOUNTER — HOSPITAL ENCOUNTER (EMERGENCY)
Age: 64
Discharge: HOME | End: 2024-07-28
Attending: EMERGENCY MEDICINE
Payer: COMMERCIAL

## 2024-07-28 VITALS
WEIGHT: 239.7 LBS | DIASTOLIC BLOOD PRESSURE: 75 MMHG | HEIGHT: 70 IN | OXYGEN SATURATION: 96 % | BODY MASS INDEX: 34.32 KG/M2 | TEMPERATURE: 98 F | RESPIRATION RATE: 16 BRPM | HEART RATE: 59 BPM | SYSTOLIC BLOOD PRESSURE: 124 MMHG

## 2024-07-28 DIAGNOSIS — R10.32 LEFT GROIN PAIN: Primary | ICD-10-CM

## 2024-07-28 PROCEDURE — 99282 EMERGENCY DEPT VISIT SF MDM: CPT

## 2024-07-28 NOTE — ED PROVIDER NOTES
Emergency Department Encounter    Patient: Arnulfo Hanley  MRN: [id number]  : 1960  Date of Evaluation: 2024  ED Provider:  Caren Torres DO    Triage Chief Complaint:   Groin Pain (Left )    [place]:  Arnulfo Hanley is a 63 y.o. male with history of peripheral arterial disease, bilateral varicose veins of the lower extremities, hypertension hyperlipidemia diabetes lymphoma that presents to the emergency department complaining of left groin pain.  Patient states been going on for the past 3 days.  He states pain 7 out of 10 on the pain scale.  He states he has never had it before.  He states burning stinging type of pain in the left.  He states no falls injuries trauma lifting pulling or straining.  He states he does have peripheral artery disease with blockages in his arteries bilaterally.  He states he is on Pletal and baby aspirin daily.  He states pain 7 out of 10 on the pain scale.  Patient states lying down makes it better, standing makes it worse.  He states he is able to ambulate.  Denies any discoloration of the left lower extremity.  He denies any pain in his testicles scrotum or penis.  Patient states he does have consultation with cardiothoracic vascular surgeon Dr. Gan in September for possible surgery.  Patient states chronic back pain.  Patient here for evaluation.    ROS - see HPI, below listed is current ROS at time of my eval:  10 systems reviewed and negative except as above.     Past Medical History:   Diagnosis Date    Diabetes (HCC)     Hyperlipidemia     Hypertension     Lung nodule     Lymphoma (HCC)     Varicose veins of both lower extremities with pain      Past Surgical History:   Procedure Laterality Date    COLONOSCOPY      COLONOSCOPY N/A 2024    COLONOSCOPY W/ ENDOSCOPIC MUCOSAL RESECTION LIFTED WITH 8ML OF BLUE BOOST, REMOVED WITH HOT SNARE AND JUMBO FORCEPS AND (3) HEMICLIPS PLACED IN THE ASCENDING COLON AT POLYPECTOMY SITE, INJECTED WITH 2 ML OF TATTOO INK

## 2024-07-28 NOTE — DISCHARGE INSTRUCTIONS
Follow-up with your cardiothoracic surgeon Dr. Gan in the morning call for an appointment for further evaluation.  Follow-up with your primary care physician Dr. Carey for evaluation of left groin pain.  Call for an appointment  Continue home Norco and gabapentin prescribed and directed for pain  Return to the emergency department immediately any discoloration of the left lower extremity increased pain cold blue extremity or any worsening symptoms.

## 2024-09-06 PROBLEM — K40.90 LEFT GROIN HERNIA: Status: ACTIVE | Noted: 2024-09-06

## 2024-09-06 PROBLEM — R10.32 LEFT GROIN PAIN: Status: ACTIVE | Noted: 2024-09-06

## 2024-09-20 PROBLEM — K40.90 LEFT INGUINAL HERNIA: Status: ACTIVE | Noted: 2024-09-20

## 2024-09-30 ENCOUNTER — TELEPHONE (OUTPATIENT)
Dept: GASTROENTEROLOGY | Age: 64
End: 2024-09-30

## 2024-10-16 ENCOUNTER — TELEPHONE (OUTPATIENT)
Dept: GASTROENTEROLOGY | Age: 64
End: 2024-10-16

## 2024-10-16 ENCOUNTER — PREP FOR PROCEDURE (OUTPATIENT)
Dept: GASTROENTEROLOGY | Age: 64
End: 2024-10-16

## 2024-10-16 DIAGNOSIS — Z86.0100 PERSONAL HISTORY OF COLON POLYPS, UNSPECIFIED: ICD-10-CM

## 2024-10-16 RX ORDER — SODIUM CHLORIDE 0.9 % (FLUSH) 0.9 %
5-40 SYRINGE (ML) INJECTION EVERY 12 HOURS SCHEDULED
Status: CANCELLED | OUTPATIENT
Start: 2024-10-16

## 2024-10-16 RX ORDER — SODIUM CHLORIDE, SODIUM LACTATE, POTASSIUM CHLORIDE, CALCIUM CHLORIDE 600; 310; 30; 20 MG/100ML; MG/100ML; MG/100ML; MG/100ML
INJECTION, SOLUTION INTRAVENOUS CONTINUOUS
Status: CANCELLED | OUTPATIENT
Start: 2024-10-16

## 2024-10-16 RX ORDER — SODIUM CHLORIDE 9 MG/ML
25 INJECTION, SOLUTION INTRAVENOUS PRN
Status: CANCELLED | OUTPATIENT
Start: 2024-10-16

## 2024-10-16 RX ORDER — SODIUM CHLORIDE 0.9 % (FLUSH) 0.9 %
5-40 SYRINGE (ML) INJECTION PRN
Status: CANCELLED | OUTPATIENT
Start: 2024-10-16

## 2024-11-11 PROBLEM — Z98.890 HISTORY OF ROBOT-ASSISTED REPAIR OF LEFT INGUINAL HERNIA: Status: ACTIVE | Noted: 2024-11-11

## 2024-11-11 PROBLEM — Z87.19 HISTORY OF ROBOT-ASSISTED REPAIR OF LEFT INGUINAL HERNIA: Status: ACTIVE | Noted: 2024-11-11

## (undated) DEVICE — FORCEPS BX 240CM JAW 3.2MM L CAP NDL MIC MESH TTH M00513372

## (undated) DEVICE — SNARE ENDOSCP L240CM W15MM SHTH DIA2.4MM CHN 2.8MM STIFF

## (undated) DEVICE — NEEDLE SCLERO 23GA L240CM OD064MM ID032MM CLR INTERJECT

## (undated) DEVICE — ENDOSCOPY KIT: Brand: MEDLINE INDUSTRIES, INC.

## (undated) DEVICE — SNARE VASC L240CM LOOP W10MM SHTH DIA2.4MM RND STIFF CLD